# Patient Record
Sex: MALE | Race: OTHER | HISPANIC OR LATINO | URBAN - METROPOLITAN AREA
[De-identification: names, ages, dates, MRNs, and addresses within clinical notes are randomized per-mention and may not be internally consistent; named-entity substitution may affect disease eponyms.]

---

## 2023-10-05 ENCOUNTER — OFFICE VISIT (OUTPATIENT)
Dept: URGENT CARE | Facility: CLINIC | Age: 43
End: 2023-10-05
Payer: COMMERCIAL

## 2023-10-05 VITALS
WEIGHT: 166.56 LBS | OXYGEN SATURATION: 97 % | DIASTOLIC BLOOD PRESSURE: 74 MMHG | HEIGHT: 66 IN | HEART RATE: 60 BPM | RESPIRATION RATE: 15 BRPM | TEMPERATURE: 98 F | BODY MASS INDEX: 26.77 KG/M2 | SYSTOLIC BLOOD PRESSURE: 121 MMHG

## 2023-10-05 DIAGNOSIS — R09.81 NASAL CONGESTION: ICD-10-CM

## 2023-10-05 DIAGNOSIS — H10.31 ACUTE BACTERIAL CONJUNCTIVITIS OF RIGHT EYE: Primary | ICD-10-CM

## 2023-10-05 PROCEDURE — 99213 PR OFFICE/OUTPT VISIT, EST, LEVL III, 20-29 MIN: ICD-10-PCS | Mod: S$GLB,,, | Performed by: PHYSICIAN ASSISTANT

## 2023-10-05 PROCEDURE — 99213 OFFICE O/P EST LOW 20 MIN: CPT | Mod: S$GLB,,, | Performed by: PHYSICIAN ASSISTANT

## 2023-10-05 RX ORDER — CIPROFLOXACIN HYDROCHLORIDE 3 MG/ML
2 SOLUTION/ DROPS OPHTHALMIC 4 TIMES DAILY
Qty: 10 ML | Refills: 0 | Status: SHIPPED | OUTPATIENT
Start: 2023-10-05 | End: 2023-10-12

## 2023-10-05 RX ORDER — FLUTICASONE PROPIONATE 50 MCG
1 SPRAY, SUSPENSION (ML) NASAL DAILY
Qty: 11.1 ML | Refills: 0 | Status: SHIPPED | OUTPATIENT
Start: 2023-10-05 | End: 2023-11-04

## 2023-10-05 RX ORDER — DEXAMETHASONE 4 MG/1
TABLET ORAL
COMMUNITY
Start: 2022-09-28 | End: 2023-10-05 | Stop reason: ALTCHOICE

## 2023-10-05 RX ORDER — SILDENAFIL CITRATE 20 MG/1
TABLET ORAL
COMMUNITY
Start: 2022-07-30 | End: 2023-10-05 | Stop reason: ALTCHOICE

## 2023-10-05 RX ORDER — TETRACYCLINE HYDROCHLORIDE 500 MG/1
CAPSULE ORAL
COMMUNITY
Start: 2023-08-17 | End: 2023-10-05 | Stop reason: ALTCHOICE

## 2023-10-05 RX ORDER — CETIRIZINE HYDROCHLORIDE 10 MG/1
10 TABLET ORAL
COMMUNITY
End: 2023-10-05 | Stop reason: ALTCHOICE

## 2023-10-05 RX ORDER — CLINDAMYCIN PHOSPHATE 11.9 MG/ML
1 SOLUTION TOPICAL 2 TIMES DAILY
COMMUNITY
Start: 2022-07-29 | End: 2023-10-05 | Stop reason: ALTCHOICE

## 2023-10-05 RX ORDER — ACYCLOVIR 400 MG/1
400 TABLET ORAL
COMMUNITY
Start: 2022-03-07 | End: 2023-10-09

## 2023-10-05 RX ORDER — VALACYCLOVIR HYDROCHLORIDE 1 G/1
4000 TABLET, FILM COATED ORAL ONCE
COMMUNITY
Start: 2023-10-04 | End: 2023-10-05 | Stop reason: ALTCHOICE

## 2023-10-05 RX ORDER — VALACYCLOVIR HYDROCHLORIDE 1 G/1
2000 TABLET, FILM COATED ORAL
COMMUNITY
Start: 2023-10-04 | End: 2023-10-05 | Stop reason: ALTCHOICE

## 2023-10-05 RX ORDER — DIPHENHYDRAMINE HCL 25 MG
50 CAPSULE ORAL
COMMUNITY
End: 2023-10-05 | Stop reason: ALTCHOICE

## 2023-10-05 RX ORDER — ACYCLOVIR 400 MG/1
TABLET ORAL
COMMUNITY
Start: 2023-10-04 | End: 2023-10-05 | Stop reason: SDUPTHER

## 2023-10-05 NOTE — PROGRESS NOTES
"Subjective:      Patient ID: Jevon Hsieh is a 43 y.o. male.    Vitals:  height is 5' 6" (1.676 m) and weight is 75.5 kg (166 lb 8.9 oz). His oral temperature is 98.4 °F (36.9 °C). His blood pressure is 121/74 and his pulse is 60. His respiration is 15 and oxygen saturation is 97%.     Chief Complaint: Foreign Body in Eye    Jevon Hsieh is a 43 y.o. male who complains of right eye injury; onset this AM 10/05/23. Pt reports he wear contacts, opened new pair of daily contacts and it dropped into the sink,  went to wash his contacts and put it back into his eye. He suspects the contact was either still dirty or didn't put them in properly. Pt c/o photosensitivity,watering, redness and burning sensation rt eye . Pt also c/o postnasal drip and headache;onset this AM 10/05/23. Pt took advil; no relief. Pt didn't flush out eyes; states he thought his tears would help flush his eyes.    Foreign Body in Eye  This is a new problem. The current episode started today. The problem occurs constantly. The problem has been unchanged. Associated symptoms include congestion, headaches and a visual change. Pertinent negatives include no abdominal pain, anorexia, arthralgias, change in bowel habit, chest pain, chills, coughing, diaphoresis, fatigue, fever, joint swelling, myalgias, nausea, neck pain, numbness, rash, sore throat, swollen glands, urinary symptoms, vertigo, vomiting or weakness. Exacerbated by: seeing. Treatments tried: advil. The treatment provided no relief.       Constitution: Negative for chills, sweating, fatigue and fever.   HENT:  Positive for congestion. Negative for postnasal drip, sinus pain, sinus pressure and sore throat.    Neck: Negative for neck pain and neck stiffness.   Cardiovascular:  Negative for chest pain.   Eyes:  Positive for eye trauma, foreign body in eye, eye discharge, eye pain, eye redness and photophobia. Negative for eye itching, vision loss, double vision, blurred vision and eyelid " swelling.   Respiratory:  Negative for cough.    Gastrointestinal:  Negative for abdominal pain, nausea and vomiting.   Genitourinary:  Negative for dysuria, frequency and urgency.   Musculoskeletal:  Negative for joint pain, joint swelling and muscle ache.   Skin:  Negative for rash.   Allergic/Immunologic: Negative for environmental allergies, seasonal allergies, food allergies, immunocompromised state and immunizations up-to-date.   Neurological:  Positive for headaches. Negative for history of vertigo and numbness.      Objective:     Physical Exam   Constitutional: He is oriented to person, place, and time. He appears well-developed. normal  HENT:   Head: Normocephalic and atraumatic.   Ears:   Right Ear: Tympanic membrane, external ear and ear canal normal.   Left Ear: Tympanic membrane, external ear and ear canal normal.   Nose: Congestion present.   Mouth/Throat: Oropharynx is clear and moist. Mucous membranes are moist. Oropharynx is clear.   Eyes: EOM and lids are normal. Pupils are equal, round, and reactive to light. Lids are everted and swept, no foreign bodies found. Right eye exhibits no chemosis, no discharge and no exudate. No foreign body present in the right eye. Right conjunctiva is injected.   Slit lamp exam:       The right eye shows no corneal abrasion and no fluorescein uptake. Extraocular movement intact vision grossly intact   Neck: Trachea normal and phonation normal. Neck supple.   Abdominal: Normal appearance.   Musculoskeletal: Normal range of motion.         General: Normal range of motion.   Neurological: He is alert and oriented to person, place, and time.   Skin: Skin is warm, dry and intact.   Psychiatric: His speech is normal and behavior is normal. Judgment and thought content normal.   Nursing note and vitals reviewed.    Vision Screening    Right eye Left eye Both eyes   Without correction      With correction 20/40 20/40 20/40         Assessment:     1. Acute bacterial  conjunctivitis of right eye    2. Nasal congestion      Patient presents with clinical exam findings and history consistent with above.      On exam, patient is nontoxic appearing and vitals are stable.      Diagnostic testing results were reviewed and discussed with patient/guardian.   Tests ordered in clinic: None  Previous progress notes/admissions/labs and medications were reviewed.    Plan:   Right eye was flushed out with saline syringes x 2. Proparacaine ophthalmic solution was used to rule out surface causes of pain (i.e. corneal or epithelial abrasion).  Patient states pain has resolved 100%.    No abrasions or foreign body noted to cornea during fluorescein eye stain test. Proparacaine hydrochloride ophthalmic solution for >5 minutes, altafluor benox ophthalmic solution, and tobramycin 0.3% ophthalmic solution were used.      Acute bacterial conjunctivitis of right eye  -     ciprofloxacin HCl (CILOXAN) 0.3 % ophthalmic solution; Place 2 drops into the right eye 4 (four) times daily. for 7 days  Dispense: 10 mL; Refill: 0  -     Ambulatory referral/consult to Ophthalmology    Nasal congestion  -     fluticasone propionate (FLONASE) 50 mcg/actuation nasal spray; 1 spray (50 mcg total) by Each Nostril route once daily.  Dispense: 11.1 mL; Refill: 0      Try oral antihistamines (zyrtec or claritin) and flonase for nasal congestion. Recommend to throw away new contacts and wear eye glasses for a few days.              1) See orders for this visit as documented in the electronic medical record.  2) Symptomatic therapy suggested: use cool compresses prn.   3) Call or return to clinic prn if these symptoms worsen or fail to improve as anticipated.    Discussed results/diagnosis/plan with patient in clinic.  We had shared decision making for patient's treatment. Patient verbalized understanding and in agreement with current treatment plan.     Patient was instructed to return for re-evaluation with urgent care or  "PCP for continued outpatient workup and management if symptoms do not improve/worsening symptoms. Strict ED versus clinic precautions given in depth.    Discharge and follow-up instructions given verbally/printed with the patient who expressed understanding. The instructions and results are also available on [employer].              Iram Marinelli PA-C          Patient Instructions   Try oral antihistamines (zyrtec or claritin) and flonase for nasal congestion.        Common side effects include stinging and burning upon instillation. Patients may find it helpful to refrigerate the drops and/or use refrigerated artificial tears before using these medications. Other adverse effects include headache and increased ocular dryness.     When using eye drops for infection, do not touch your good eye after touching your infected eye. Also, do not touch the bottle or dropper directly onto one eye and then use it in the other. Doing these things can cause the infection to spread from one eye to the other.    If you wear contact lenses and you have symptoms of pink eye, it is really important to have a doctor look at your eyes. In people who wear contacts, the symptoms of pink eye can be caused by "corneal abrasion." Corneal abrasion is a scratch on the eye and can be a serious problem.. If your contacts are disposable, you will want to throw them away and start fresh. If you contacts are not disposable, you will need to carefully clean them. You should also throw away your contact lens case and get a new one.    Please remember that you have received care at an urgent care today. Urgent cares are not emergency rooms and are not equipped to handle life threatening emergencies and cannot rule in or out certain medical conditions and you may be released before all of your medical problems are known or treated. Please arrange follow up with your primary care physician or speciality clinic (optometry or opthalmology)  within 2-5 days " if your signs and symptoms have not resolved or worsen. Patient can call our Referral Hotline at [phone] to make an appointment.    Please return here or go to the Emergency Department for any concerns or worsening of condition.Patient was educated on signs/symptoms that would warrant emergent medical attention. Patient verbalized understanding.  (Worsening vision, eye pain, sensitivity to light, increased eye discharge)

## 2023-10-05 NOTE — PATIENT INSTRUCTIONS
"Try oral antihistamines (zyrtec or claritin) and flonase for nasal congestion.        Common side effects include stinging and burning upon instillation. Patients may find it helpful to refrigerate the drops and/or use refrigerated artificial tears before using these medications. Other adverse effects include headache and increased ocular dryness.     When using eye drops for infection, do not touch your good eye after touching your infected eye. Also, do not touch the bottle or dropper directly onto one eye and then use it in the other. Doing these things can cause the infection to spread from one eye to the other.    If you wear contact lenses and you have symptoms of pink eye, it is really important to have a doctor look at your eyes. In people who wear contacts, the symptoms of pink eye can be caused by "corneal abrasion." Corneal abrasion is a scratch on the eye and can be a serious problem.. If your contacts are disposable, you will want to throw them away and start fresh. If you contacts are not disposable, you will need to carefully clean them. You should also throw away your contact lens case and get a new one.    Please remember that you have received care at an urgent care today. Urgent cares are not emergency rooms and are not equipped to handle life threatening emergencies and cannot rule in or out certain medical conditions and you may be released before all of your medical problems are known or treated. Please arrange follow up with your primary care physician or speciality clinic (optometry or opthalmology)  within 2-5 days if your signs and symptoms have not resolved or worsen. Patient can call our Referral Hotline at [phone] to make an appointment.    Please return here or go to the Emergency Department for any concerns or worsening of condition.Patient was educated on signs/symptoms that would warrant emergent medical attention. Patient verbalized understanding.  (Worsening vision, eye pain, " sensitivity to light, increased eye discharge)